# Patient Record
Sex: MALE | Race: WHITE | Employment: FULL TIME | URBAN - METROPOLITAN AREA
[De-identification: names, ages, dates, MRNs, and addresses within clinical notes are randomized per-mention and may not be internally consistent; named-entity substitution may affect disease eponyms.]

---

## 2020-02-07 ENCOUNTER — DOCUMENTATION ONLY (OUTPATIENT)
Dept: SURGERY | Age: 75
End: 2020-02-07

## 2020-02-07 NOTE — PROGRESS NOTES
Received a fax for Dr. Axel Castro  from [address] [place] [address] regarding this patient. I called the above and spoke to Nilam (sp) and explained to her we get several faxes for Dr. Axel Castro (Dermatologist) in error. Fax placed to be shredded.